# Patient Record
Sex: MALE | Race: WHITE
[De-identification: names, ages, dates, MRNs, and addresses within clinical notes are randomized per-mention and may not be internally consistent; named-entity substitution may affect disease eponyms.]

---

## 2020-07-17 ENCOUNTER — HOSPITAL ENCOUNTER (OUTPATIENT)
Dept: HOSPITAL 61 - LAB | Age: 65
Discharge: HOME | End: 2020-07-17
Attending: ORTHOPAEDIC SURGERY
Payer: COMMERCIAL

## 2020-07-17 DIAGNOSIS — Z01.818: Primary | ICD-10-CM

## 2020-07-17 DIAGNOSIS — G56.01: ICD-10-CM

## 2020-07-17 DIAGNOSIS — Z11.59: ICD-10-CM

## 2020-07-22 ENCOUNTER — HOSPITAL ENCOUNTER (OUTPATIENT)
Dept: HOSPITAL 61 - SURG | Age: 65
Discharge: HOME | End: 2020-07-22
Attending: ORTHOPAEDIC SURGERY
Payer: COMMERCIAL

## 2020-07-22 VITALS — BODY MASS INDEX: 36.1 KG/M2 | WEIGHT: 230 LBS | HEIGHT: 67 IN

## 2020-07-22 VITALS
SYSTOLIC BLOOD PRESSURE: 117 MMHG | DIASTOLIC BLOOD PRESSURE: 62 MMHG | SYSTOLIC BLOOD PRESSURE: 117 MMHG | DIASTOLIC BLOOD PRESSURE: 62 MMHG

## 2020-07-22 DIAGNOSIS — I10: ICD-10-CM

## 2020-07-22 DIAGNOSIS — E78.5: ICD-10-CM

## 2020-07-22 DIAGNOSIS — Z98.890: ICD-10-CM

## 2020-07-22 DIAGNOSIS — Z79.899: ICD-10-CM

## 2020-07-22 DIAGNOSIS — Z83.3: ICD-10-CM

## 2020-07-22 DIAGNOSIS — G56.01: Primary | ICD-10-CM

## 2020-07-22 DIAGNOSIS — E78.00: ICD-10-CM

## 2020-07-22 PROCEDURE — 64721 CARPAL TUNNEL SURGERY: CPT

## 2020-07-22 PROCEDURE — A7015 AEROSOL MASK USED W NEBULIZE: HCPCS

## 2020-07-22 NOTE — PDOC1
History and Physical


Date of Admission


Date of Admission


DATE: 7/22/20 


TIME: 10:28





Identification/Chief Complaint


Chief Complaint


Right carpal tunnel syndrome





Source


Source:  Chart review, Patient





History of Present Illness


History of Present Illness


Jessica is here with right hand numbness, and also has left hand numbness.  An EMG 

confirms carpal tunnel syndrome.  Left hand dominant. He has been wearing 

bilateral hand braces with only partial relief.  He currently describes numbness

in both hands, exacerbated by riding a bicycle, doing housework, or driving.  He

is here today for elective right carpal tunnel release surgery





Past Medical History


Past Medical History


ADHD-combined type.


High blood pressure.


High cholesterol.


Cardiovascular:  HTN, Hyperlipidemia


Psych:  Other (ADHD)





Past Surgical History


Past Surgical History


back surgery - hernia 2009


Past Surgical History:  Hernia Repair





Family History


Family History:  Cancer, Diabetes, Heart Disease





Social History


Smoke:  No


ALCOHOL:  occassional





Current Medications


Current Medications





Current Medications


Ondansetron HCl (Zofran) 4 mg PRN Q6HRS  PRN IV NAUSEA/VOMITING;  Start 7/22/20 

at 07:00;  Stop 7/23/20 at 06:59


Fentanyl Citrate (Fentanyl 2ml Vial) 25 mcg PRN Q5MIN  PRN IV MILD PAIN 1-3;  

Start 7/22/20 at 07:00;  Stop 7/23/20 at 06:59


Fentanyl Citrate (Fentanyl 2ml Vial) 50 mcg PRN Q5MIN  PRN IV MODERATE TO SEVERE

PAIN;  Start 7/22/20 at 07:00;  Stop 7/23/20 at 06:59


Morphine Sulfate (Morphine Sulfate) 1 mg PRN Q10MIN  PRN IV SEVERE PAIN 7-10;  

Start 7/22/20 at 07:00;  Stop 7/23/20 at 06:59


Ringer's Solution 1,000 ml @  30 mls/hr Q24H IV  Last administered on 7/22/20at 

09:32;  Start 7/22/20 at 07:00;  Stop 7/22/20 at 18:59


Lidocaine HCl (Xylocaine-Mpf 1% 2ml Vial) 2 ml PRN 1X  PRN ID PRIOR TO IV START;

 Start 7/22/20 at 07:00;  Stop 7/23/20 at 06:59


Hydromorphone HCl (Dilaudid) 0.5 mg PRN Q10MIN  PRN IV SEV PAIN, Second choice; 

Start 7/22/20 at 07:00;  Stop 7/23/20 at 06:59


Prochlorperazine Edisylate (Compazine) 5 mg PACU PRN  PRN IV NAUSEA, MRX1;  

Start 7/22/20 at 07:00;  Stop 7/23/20 at 06:59


Cefazolin Sodium/ Dextrose 50 ml @  100 mls/hr 1X PREOP  PRN IV PRIOR TO 

PROCEDURE;  Start 7/22/20 at 06:00;  Stop 7/22/20 at 18:00


Propofol 50 ml @ As Directed STK-MED ONCE IV ;  Start 7/22/20 at 09:53;  Stop 

7/22/20 at 09:53;  Status DC


Lidocaine HCl (Lidocaine Pf 2% Vial) 5 ml STK-MED ONCE .ROUTE ;  Start 7/22/20 

at 09:53;  Stop 7/22/20 at 09:53;  Status DC


Lidocaine HCl (Lidocaine Pf 2% Vial) 5 ml STK-MED ONCE .ROUTE ;  Start 7/22/20 

at 09:53;  Stop 7/22/20 at 09:53;  Status DC


Lidocaine HCl (Lidocaine Pf 2% Vial) 5 ml STK-MED ONCE .ROUTE ;  Start 7/22/20 

at 09:55;  Stop 7/22/20 at 09:55;  Status DC


Sodium Chloride (SODIUM CHLORIDE 20ml) 20 ml STK-MED ONCE IJ ;  Start 7/22/20 at

09:55;  Stop 7/22/20 at 09:56;  Status DC


Sodium Chloride (SODIUM CHLORIDE 20ml) 20 ml STK-MED ONCE IJ ;  Start 7/22/20 at

09:55;  Stop 7/22/20 at 09:56;  Status DC


Midazolam HCl (Versed) 2 mg STK-MED ONCE .ROUTE ;  Start 7/22/20 at 09:59;  Stop

7/22/20 at 09:59;  Status DC





Active Scripts


Active


Reported


Children's Aspirin (Aspirin) 81 Mg Tab.chew 81 Mg PO DAILY


Adderall 20 Mg Tablet (Dextroamphetamine/Amphetamine) 20 Mg Tablet 20 Mg PO 

DAILY


Xanax (Alprazolam) 0.25 Mg Tablet 0.25 Mg PO PRN Q6HRS PRN


Citalopram Hbr (Citalopram Hydrobromide) 10 Mg Tablet 10 Mg PO DAILY


Allopurinol 100 Mg Tablet 200 Mg PO DAILY


Losartan Potassium 50 Mg Tablet 50 Mg PO DAILY


Zocor (Simvastatin) 10 Mg Tablet 10 Mg PO HS





Allergies


Allergies:  


Coded Allergies:  


     No Known Drug Allergies (Unverified , 7/22/20)





ROS


PSYCHOLOGICAL ROS:  YES: Concentration difficultie


Eyes:  No Double vision


HEENT:  No: Hearing change


Respiratory:  No: Cough, Shortness of breath, SOB with excertion


Cardiovascular:  No Chest Pain


Gastrointestinal:  No Nausea, No Vomiting, No Diarrhea, No Constipation


Musculoskeletal:  Yes Joint Pain, Yes Pain In: (back and right hip, relieved 

with lumbar epidural steroid injection)


Neurological:  Yes Gait Disturbance (back and hip pain, difficulty walking the 

dog)


Skin:  No Hair Changes





Physical Exam


General:  Alert, Cooperative


HEENT:  Atraumatic


Lungs:  Normal air movement


Heart:  RRR


Abdomen:  Soft


Extremities:  No clubbing, No cyanosis, No edema, Normal pulses, Other (The 

overall alignment of the RIGHT hand is normal. There are no masses. Minimal 

tenderness except over the median nerve. Light touch sensation is decreased in 

the thumb index and long finger.  strength is intact for motor strength but 

subjectively slightly weakened. Pulses intact. Phalen's positive.  There is 

trace thenar atrophy. Tinel's equivocal. Tinel's at the elbow is negative. 

Mildly positive Grind test)


Skin:  No significant lesion


Neuro:  Normal speech, Normal tone


Psych/Mental Status:  Mood NL





Vitals


Vitals





Vital Signs








  Date Time  Temp Pulse Resp B/P (MAP) Pulse Ox O2 Delivery O2 Flow Rate FiO2


 


7/22/20 09:18 97.1 57 16 131/81 97 Room Air  





 97.1       











Labs


Labs


EMG from 11/2/2017 was reviewed.  The right median distal sensory latency at the

second digit showed no response.  The mid palm sensory latency was 3.3 ms.  

Motor latency across the wrist was 4.43 ms.  Nerve conduction studies revealed 

absence of the right median distal sensory response with digital stimulation 

while the response obtained with palmar stimulation demonstrates moderately 

prolonged distal latency and reduced amplitude.  The left median distal sensory 

responses demonstrated moderately prolonged distal latencies and reduced 

amplitudes.  Needle examination was unremarkable.  The impression was mild 

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 

syndrome.  This was slightly worse in the right upper extremity.





VTE Prophylaxis Ordered


VTE Prophylaxis Devices:  No


VTE Pharmacological Prophylaxi:  No





Assessment/Plan


Assessment/Plan


He has bilateral carpal tunnel syndrome.  [facility]

[facility] rather than simultaneous bilateral carpal tunnel release.  We will plan for 

right carpal tunnel surgery today.  He is here for elective right carpal tunnel 

release under regional anesthetic.We discussed the potential risks of infection,

neurovascular injury, bleeding, painful scar, prolonged pain, or other potential

surgical or anesthetic complications. We also discussed postoperative treatment 

and expectations.  All of his questions were answered and he desires to proceed 

with surgery today.





Justicifation of Admission Dx:


Justifications for Admission:


Justification of Admission Dx:  N/A











SONI CONTI MD                 Jul 22, 2020 10:40

## 2020-07-22 NOTE — PDOC4
Operative Note


Operative Note





DATE: July 22, 2020





PREOPERATIVE DIAGNOSIS Carpal tunnel syndrome, right upper limb G56.01 





POSTOPERATIVE DIAGNOSIS: Carpal tunnel syndrome, right upper limb G56.01





PROCEDURES PERFORMED:  right wrist, open carpal tunnel release (neuroplasty of 

the median nerve at the carpal tunnel, CPT 67842





SURGEON:  Soni Fuentes MD





ASSISTANT: Ga PRITCHETT





ANESTHESIA: Regional intravenous block ([place] Block)





ESTIMATED BLOOD LOSS: 20 mL





SPECIMENS:  none 





DRAINS: none





COMPLICATIONS: none      





TOURNIQUET:  22 minutes at 250 mm Hg





INDICATIONS FOR PROCEDURE: The patient is a 65-year-old with right hand pain and

numbness. EMG confirms carpal tunnel syndrome. The patient and I discussed 

carpal tunnel release surgery along with the potential risks of infection, 

neurovascular injury, bleeding, persistent or recurrent carpal tunnel syndrome, 

wound healing problems, or other surgical or anesthetic complications. All of 

the patients questions about surgery were answered and they desired to proceed.

Written consent was obtained.





DESCRIPTION OF OPERATION: 


The patient was identified in the preoperative holding area. The correct right 

hand was marked by me. The patient was taken to the operating room and 

positioned supine on the operating table with the right arm extended on an arm 

board. A tourniquet was placed on the upper right arm.  Preoperative antibiotics

were given intravenously. A timeout procedure was performed. A [place] block was 

performed by the anesthesia team, and the tourniquet was inflated as part of the

anesthetic.The limb was prepared in sterile fashion with ChloraPrep solution and

sterile drapes were applied.





Forceps were used to pinch the skin at the wrist, to check the adequacy of the 

block.A curvilinear incision was made with a 15 blade scalpel, 2mm ulnar to the 

thenar crease, using landmarks including the thenar crease, Kaplans cardinal 

line, the ulnar border of the fingernail of the ring finger, the wrist flexion 

crease, and the palmaris longus tendon. Care was made not to extend this 

incision beyond Kaplans cardinal line, so as to avoid arterial injury. Loupe 

magnification (3.5x  extended field) was used throughout to prevent neur

ovascular injury. Sharp dissection was used through the subcutaneous tissues, 

and the palmaris longus tendon was retracted toward the thumb. Bipolar 

electrocautery was used for hemostasis. The transverse carpal ligament was 

identified and was divided under direct vision proximally and distally. Scissor 

dissection was used proximal to the wrist flexion crease to complete the proxima

l release of the transverse carpal ligament with the skin elevated away from the

dissection. Digital palpation was used proximally and distally, ensuring a 

complete release. A gentle scissor neuroplasty was now performed along the ulnar

aspect of the median nerve, releasing dense adhesions and dense synovium.  He 

had more prominent distended small veins within the carpal canal than is usually

seen. The right median nerve was thinned. Care was made not to injure the motor 

branch on the radial aspect. 





Copious saline irrigation was used. The tourniquet was released.  Bipolar 

electrocautery was used for hemostasis. The skin edges were injected with 0.25% 

bupivacaine with epinephrine. The skin edges were reapproximated with 3-0 

Prolene horizontal mattress sutures by my assistant. Needle and sponge counts 

were correct. Xeroform and a sterile dressing were applied. here were no 

apparent complications. The patient returned to the recovery room in stable 

condition.











SONI FUENTES MD                 Jul 22, 2020 11:36

## 2020-08-21 ENCOUNTER — HOSPITAL ENCOUNTER (OUTPATIENT)
Dept: HOSPITAL 61 - LAB | Age: 65
Discharge: HOME | End: 2020-08-21
Attending: ORTHOPAEDIC SURGERY
Payer: COMMERCIAL

## 2020-08-21 DIAGNOSIS — G56.00: ICD-10-CM

## 2020-08-21 DIAGNOSIS — Z01.812: Primary | ICD-10-CM

## 2020-08-21 DIAGNOSIS — Z20.828: ICD-10-CM

## 2020-08-28 ENCOUNTER — HOSPITAL ENCOUNTER (OUTPATIENT)
Dept: HOSPITAL 61 - SURG | Age: 65
Discharge: HOME | End: 2020-08-28
Attending: ORTHOPAEDIC SURGERY
Payer: COMMERCIAL

## 2020-08-28 VITALS — BODY MASS INDEX: 35.99 KG/M2 | WEIGHT: 229.28 LBS | HEIGHT: 67 IN

## 2020-08-28 VITALS — DIASTOLIC BLOOD PRESSURE: 76 MMHG | SYSTOLIC BLOOD PRESSURE: 154 MMHG

## 2020-08-28 DIAGNOSIS — Z83.3: ICD-10-CM

## 2020-08-28 DIAGNOSIS — Z79.899: ICD-10-CM

## 2020-08-28 DIAGNOSIS — I10: ICD-10-CM

## 2020-08-28 DIAGNOSIS — Z98.890: ICD-10-CM

## 2020-08-28 DIAGNOSIS — G56.02: Primary | ICD-10-CM

## 2020-08-28 PROCEDURE — 64721 CARPAL TUNNEL SURGERY: CPT

## 2020-08-28 RX ADMIN — SODIUM CHLORIDE, SODIUM LACTATE, POTASSIUM CHLORIDE, AND CALCIUM CHLORIDE SCH MLS/HR: .6; .31; .03; .02 INJECTION, SOLUTION INTRAVENOUS at 08:50

## 2020-08-28 RX ADMIN — SODIUM CHLORIDE, SODIUM LACTATE, POTASSIUM CHLORIDE, AND CALCIUM CHLORIDE SCH MLS/HR: .6; .31; .03; .02 INJECTION, SOLUTION INTRAVENOUS at 10:12

## 2020-08-28 NOTE — PDOC4
Operative Note


Operative Note


DATE: August 28, 2020





PREOPERATIVE DIAGNOSIS Carpal tunnel syndrome, left upper limb G56.02: 





POSTOPERATIVE DIAGNOSIS: Carpal tunnel syndrome, left upper limb G56.02





PROCEDURES PERFORMED:  left wrist, open carpal tunnel release (neuroplasty of 

the median nerve at the carpal tunnel, CPT 65564)





SURGEON:  Soni Fuentes MD





ASSISTANT:





ANESTHESIA: Regional intravenous block ([place] Block)





ESTIMATED BLOOD LOSS: 5 mL





SPECIMENS:  none 





DRAINS: none





COMPLICATIONS: none      





TOURNIQUET:  26 minutes at 250 mm Hg





INDICATIONS FOR PROCEDURE: The patient is a 65-year-old with left hand pain and 

numbness. EMG confirms carpal tunnel syndrome. The patient and I discussed 

carpal tunnel release surgery along with the potential risks of infection, 

neurovascular injury, bleeding, persistent or recurrent carpal tunnel syndrome, 

wound healing problems, or other surgical or anesthetic complications. All of 

the patients questions about surgery were answered and they desired to proceed.

Written consent was obtained.





DESCRIPTION OF OPERATION: 


The patient was identified in the preoperative holding area. The correct left 

hand was marked by me. The patient was taken to the operating room and 

positioned supine on the operating table with the left arm extended on an arm 

board. A tourniquet was placed on the upper left arm.  Preoperative antibiotics 

were given intravenously. A timeout procedure was performed. A [place] block was 

performed by the anesthesia team, and the tourniquet was inflated as part of the

anesthetic.The limb was prepared in sterile fashion with ChloraPrep solution and

sterile drapes were applied.





Forceps were used to pinch the skin at the wrist, to check the adequacy of the 

block.A curvilinear incision was made with a 15 blade scalpel, 2mm ulnar to the 

thenar crease, using landmarks including the thenar crease, Kaplans cardinal 

line, the ulnar border of the fingernail of the ring finger, the wrist flexion 

crease, and the palmaris longus tendon. Care was made not to extend this 

incision beyond Kaplans cardinal line, so as to avoid arterial injury. Loupe 

magnification (3.5x  extended field) was used throughout to prevent 

neurovascular injury. Sharp dissection was used through the subcutaneous 

tissues, and the palmaris longus tendon was retracted toward the thumb. Bipolar 

electrocautery was used for hemostasis. The transverse carpal ligament was 

identified and was divided under direct vision proximally and distally. Scissor 

dissection was used proximal to the wrist flexion crease to complete the 

proximal release of the transverse carpal ligament with the skin elevated away 

from the dissection. Digital palpation was used proximally and distally, 

ensuring a complete release. A gentle scissor neuroplasty was now performed 

along the ulnar aspect of the median nerve, releasing dense adhesions and dense 

synovium. The left median nerve was thinned. Care was made not to injure the 

motor branch on the radial aspect. 





Copious saline irrigation was used.   Bipolar electrocautery was used for 

hemostasis. The skin edges were injected with 0.25% bupivacaine with 

epinephrine. The skin edges were reapproximated with 3-0 Prolene horizontal 

mattress sutures. Needle and sponge counts were correct. Xeroform and a sterile 

dressing were applied. The tourniquet was released.  There were no apparent 

complications. The patient returned to the recovery room in stable condition.











SONI FUENTES MD                 Aug 28, 2020 09:28